# Patient Record
Sex: MALE | Race: WHITE | NOT HISPANIC OR LATINO | Employment: OTHER | URBAN - METROPOLITAN AREA
[De-identification: names, ages, dates, MRNs, and addresses within clinical notes are randomized per-mention and may not be internally consistent; named-entity substitution may affect disease eponyms.]

---

## 2022-01-01 ENCOUNTER — APPOINTMENT (OUTPATIENT)
Dept: CT IMAGING | Facility: CLINIC | Age: 72
End: 2022-01-01
Attending: EMERGENCY MEDICINE
Payer: MEDICARE

## 2022-01-01 ENCOUNTER — HOSPITAL ENCOUNTER (EMERGENCY)
Facility: CLINIC | Age: 72
Discharge: HOME | End: 2022-11-18
Attending: EMERGENCY MEDICINE | Admitting: EMERGENCY MEDICINE
Payer: MEDICARE

## 2022-01-01 VITALS
OXYGEN SATURATION: 97 % | HEART RATE: 100 BPM | SYSTOLIC BLOOD PRESSURE: 114 MMHG | TEMPERATURE: 96.7 F | WEIGHT: 128 LBS | RESPIRATION RATE: 16 BRPM | DIASTOLIC BLOOD PRESSURE: 64 MMHG

## 2022-01-01 DIAGNOSIS — K59.03 DRUG-INDUCED CONSTIPATION: ICD-10-CM

## 2022-01-01 DIAGNOSIS — D72.818 OTHER DECREASED WHITE BLOOD CELL (WBC) COUNT: ICD-10-CM

## 2022-01-01 DIAGNOSIS — N28.9 RENAL DYSFUNCTION: ICD-10-CM

## 2022-01-01 LAB
ALBUMIN SERPL-MCNC: 2 G/DL (ref 3.4–5)
ALP SERPL-CCNC: 53 U/L (ref 40–150)
ALT SERPL W P-5'-P-CCNC: 25 U/L (ref 0–70)
ANION GAP SERPL CALCULATED.3IONS-SCNC: 10 MMOL/L (ref 3–14)
AST SERPL W P-5'-P-CCNC: 27 U/L (ref 0–45)
ATRIAL RATE - MUSE: 100 BPM
BASOPHILS # BLD MANUAL: 0 10E3/UL (ref 0–0.2)
BASOPHILS NFR BLD MANUAL: 0 %
BILIRUB SERPL-MCNC: 0.6 MG/DL (ref 0.2–1.3)
BUN SERPL-MCNC: 38 MG/DL (ref 7–30)
CALCIUM SERPL-MCNC: 8 MG/DL (ref 8.5–10.1)
CHLORIDE BLD-SCNC: 101 MMOL/L (ref 94–109)
CO2 SERPL-SCNC: 22 MMOL/L (ref 20–32)
CREAT SERPL-MCNC: 1.27 MG/DL (ref 0.66–1.25)
DIASTOLIC BLOOD PRESSURE - MUSE: NORMAL MMHG
EOSINOPHIL # BLD MANUAL: 0 10E3/UL (ref 0–0.7)
EOSINOPHIL NFR BLD MANUAL: 0 %
ERYTHROCYTE [DISTWIDTH] IN BLOOD BY AUTOMATED COUNT: 14.1 % (ref 10–15)
GFR SERPL CREATININE-BSD FRML MDRD: 59 ML/MIN/1.73M2
GLUCOSE BLD-MCNC: 158 MG/DL (ref 70–99)
HCT VFR BLD AUTO: 32 % (ref 40–53)
HGB BLD-MCNC: 10.5 G/DL (ref 13.3–17.7)
HOLD SPECIMEN: NORMAL
INTERPRETATION ECG - MUSE: NORMAL
LIPASE SERPL-CCNC: 144 U/L (ref 73–393)
LYMPHOCYTES # BLD MANUAL: 0.2 10E3/UL (ref 0.8–5.3)
LYMPHOCYTES NFR BLD MANUAL: 6 %
MCH RBC QN AUTO: 30.8 PG (ref 26.5–33)
MCHC RBC AUTO-ENTMCNC: 32.8 G/DL (ref 31.5–36.5)
MCV RBC AUTO: 94 FL (ref 78–100)
METAMYELOCYTES # BLD MANUAL: 0 10E3/UL
METAMYELOCYTES NFR BLD MANUAL: 1 %
MONOCYTES # BLD MANUAL: 0 10E3/UL (ref 0–1.3)
MONOCYTES NFR BLD MANUAL: 1 %
NEUTROPHILS # BLD MANUAL: 2.7 10E3/UL (ref 1.6–8.3)
NEUTROPHILS NFR BLD MANUAL: 92 %
P AXIS - MUSE: 29 DEGREES
PLAT MORPH BLD: ABNORMAL
PLATELET # BLD AUTO: 457 10E3/UL (ref 150–450)
POTASSIUM BLD-SCNC: 4.6 MMOL/L (ref 3.4–5.3)
PR INTERVAL - MUSE: 152 MS
PROT SERPL-MCNC: 6.6 G/DL (ref 6.8–8.8)
QRS DURATION - MUSE: 84 MS
QT - MUSE: 358 MS
QTC - MUSE: 461 MS
R AXIS - MUSE: 8 DEGREES
RBC # BLD AUTO: 3.41 10E6/UL (ref 4.4–5.9)
RBC MORPH BLD: ABNORMAL
SODIUM SERPL-SCNC: 133 MMOL/L (ref 133–144)
SYSTOLIC BLOOD PRESSURE - MUSE: NORMAL MMHG
T AXIS - MUSE: 35 DEGREES
VENTRICULAR RATE- MUSE: 100 BPM
WBC # BLD AUTO: 2.9 10E3/UL (ref 4–11)

## 2022-01-01 PROCEDURE — 96374 THER/PROPH/DIAG INJ IV PUSH: CPT | Mod: 59

## 2022-01-01 PROCEDURE — 93005 ELECTROCARDIOGRAM TRACING: CPT

## 2022-01-01 PROCEDURE — 85027 COMPLETE CBC AUTOMATED: CPT | Performed by: EMERGENCY MEDICINE

## 2022-01-01 PROCEDURE — 96372 THER/PROPH/DIAG INJ SC/IM: CPT | Performed by: EMERGENCY MEDICINE

## 2022-01-01 PROCEDURE — 250N000011 HC RX IP 250 OP 636: Performed by: EMERGENCY MEDICINE

## 2022-01-01 PROCEDURE — 250N000009 HC RX 250: Performed by: EMERGENCY MEDICINE

## 2022-01-01 PROCEDURE — 80053 COMPREHEN METABOLIC PANEL: CPT | Performed by: EMERGENCY MEDICINE

## 2022-01-01 PROCEDURE — 96361 HYDRATE IV INFUSION ADD-ON: CPT

## 2022-01-01 PROCEDURE — 74177 CT ABD & PELVIS W/CONTRAST: CPT | Mod: MG

## 2022-01-01 PROCEDURE — 83690 ASSAY OF LIPASE: CPT | Performed by: EMERGENCY MEDICINE

## 2022-01-01 PROCEDURE — 96375 TX/PRO/DX INJ NEW DRUG ADDON: CPT

## 2022-01-01 PROCEDURE — 258N000003 HC RX IP 258 OP 636: Performed by: EMERGENCY MEDICINE

## 2022-01-01 PROCEDURE — 99285 EMERGENCY DEPT VISIT HI MDM: CPT | Mod: 25

## 2022-01-01 PROCEDURE — 85007 BL SMEAR W/DIFF WBC COUNT: CPT | Performed by: EMERGENCY MEDICINE

## 2022-01-01 PROCEDURE — 36415 COLL VENOUS BLD VENIPUNCTURE: CPT | Performed by: EMERGENCY MEDICINE

## 2022-01-01 RX ORDER — IOPAMIDOL 755 MG/ML
64 INJECTION, SOLUTION INTRAVASCULAR ONCE
Status: COMPLETED | OUTPATIENT
Start: 2022-01-01 | End: 2022-01-01

## 2022-01-01 RX ORDER — METHYLNALTREXONE BROMIDE 8 MG/.4ML
8 INJECTION, SOLUTION SUBCUTANEOUS
Qty: 6 ML | Refills: 0 | Status: SHIPPED | OUTPATIENT
Start: 2022-01-01

## 2022-01-01 RX ORDER — ONDANSETRON 2 MG/ML
4 INJECTION INTRAMUSCULAR; INTRAVENOUS ONCE
Status: COMPLETED | OUTPATIENT
Start: 2022-01-01 | End: 2022-01-01

## 2022-01-01 RX ADMIN — SODIUM CHLORIDE 500 ML: 9 INJECTION, SOLUTION INTRAVENOUS at 12:43

## 2022-01-01 RX ADMIN — ONDANSETRON 4 MG: 2 INJECTION INTRAMUSCULAR; INTRAVENOUS at 14:49

## 2022-01-01 RX ADMIN — METHYLNALTREXONE BROMIDE 8 MG: 12 INJECTION, SOLUTION SUBCUTANEOUS at 14:49

## 2022-01-01 RX ADMIN — HYDROMORPHONE HYDROCHLORIDE 1 MG: 1 INJECTION, SOLUTION INTRAMUSCULAR; INTRAVENOUS; SUBCUTANEOUS at 12:43

## 2022-01-01 RX ADMIN — SODIUM CHLORIDE 60 ML: 9 INJECTION, SOLUTION INTRAVENOUS at 12:54

## 2022-01-01 RX ADMIN — IOPAMIDOL 64 ML: 755 INJECTION, SOLUTION INTRAVENOUS at 12:54

## 2022-01-01 ASSESSMENT — ENCOUNTER SYMPTOMS
VOMITING: 1
CONSTIPATION: 1
ABDOMINAL PAIN: 1
APPETITE CHANGE: 1
FEVER: 0
NAUSEA: 1
FATIGUE: 1

## 2022-01-01 ASSESSMENT — ACTIVITIES OF DAILY LIVING (ADL)
ADLS_ACUITY_SCORE: 35
ADLS_ACUITY_SCORE: 35

## 2022-11-18 NOTE — ED TRIAGE NOTES
PT HAS STAGE 4 CANCER ( PANCREATIC ) - HAVING INCREASED ABD PAIN - NOT EATING - A LITTLE VOMITING TODAY   PT HAS UNDERGONE 3 DRAINAGE PROCEDURES FOR HIS METASTATIC ACSITES . WORRIED ABOUT BOWEL OBSTRUCTION TODAY PER PT'S PRIMARY .      Triage Assessment     Row Name 11/18/22 1120       Triage Assessment (Adult)    Airway WDL WDL       Respiratory WDL    Respiratory WDL WDL       Skin Circulation/Temperature WDL    Skin Circulation/Temperature WDL X  PALE       Cardiac WDL    Cardiac WDL WDL       Peripheral/Neurovascular WDL    Peripheral Neurovascular WDL WDL       Cognitive/Neuro/Behavioral WDL    Cognitive/Neuro/Behavioral WDL WDL

## 2022-11-18 NOTE — ED PROVIDER NOTES
History   Chief Complaint:  Abdominal Pain     The history is provided by the patient, the spouse and medical records.      Jax Cameron is a 72 year old male with history of stage 4 pancreatic cancer metastasized to the liver, hypertension, hypercholesterolemia, and type 2 diabetes mellitus who presents with abdominal pain. The patient was recently diagnosed with stage 4 pancreatic cancer approximately 2.5 weeks ago. He was active and energetic prior to his diagnosis, however he has become increasingly fatigued and has developed sharp generalized abdominal pain, which has been worsening as well. He initially developed a distended feeling in his abdomen, which had resolved after 2L of ascites fluid was removed. He has had two more paracentesis procedures for his malignant ascites. His second paracentesis was Sunday morning, 5 days ago, where he had 4L of fluid removed. He continued to experience abdominal pain and had an additional 2.3L of fluid removed yesterday, with 1L of IVF given via his port afterwards due to recent reduced PO intake. Here, he continues to endorse generalized sharp abdominal pain. He has increased his Dilaudid dosage and is given 2 mg of this every 3 hours with minimal improvement in pain. He has not had a bowel movement for the past few days and reports he has not passed gas either. He has not been eating well and was unable to maintain PO intake today. He has had an episode of emesis today after attempting to eat. He voices concern that eating increases his nausea. He had his first chemotherapy visit on Tuesday, 3 days ago, and has been following the nausea regimen of Zofran TID with mild improvement of nausea. He denies any high fevers at home. He endorses abdominal pain that occasionally radiates to his back. He states pain worsens with movement and ambulation, and is slightly relieved when lying supine or taking pain medications. He had some bright red blood in his stool a few weeks ago,  however he was constipated during that time and has history of hemorrhoids. He has had recent imaging done, including a abdominal CT scan and PET scan, which was negative for bowel obstruction or new advancing malignancies. The patient follows Dr. Simon Irene of oncology from [facility].     Imaging from 11/13/2022  CT ABD PELVIS W IV CONT ONLY  IMPRESSION:   1.  Little short interval change in the approximately 4.4 cm infiltrative mass of the pancreatic tail concerning for adenocarcinoma. As before, the mass directly invades the splenic hilum and encases and occludes the splenic artery and vein and may directly involve the spleen. The mass contacts the left adrenal gland and posterior wall of the stomach.   2.  Extensive peritoneal and omental carcinomatosis.   3.  Large volume of presumably malignant ascites has increased from previous.   4.  No short interval change in a 2.2 cm hypodense lesion of the left hepatic lobe suspicious for a metastasis.   5.  6 mm subcapsular enhancing focus of the left hepatic lobe was not conspicuous on the recent prior study. This is indeterminate, possibly a benign perfusion anomaly. Attention on follow-up recommended.   6.  Persistent mild to moderate right hydronephrosis despite adequate positioning of the ureteral stent. Finding suggests possible stent malfunction. Clinical correlation recommended.   7.  Small bilateral pleural effusions and bibasilar atelectasis.  Reading per radiology.    Imaging from 11/14/2022  NM PET/CT SKULL BASE TO MID THIGH   IMPRESSION:   Findings suspicious for pancreatic tail neoplasm with left adrenal and diffuse peritoneal metastases.  Reading per radiology.    Review of Systems   Constitutional: Positive for appetite change and fatigue. Negative for fever.   Gastrointestinal: Positive for abdominal pain, constipation, nausea and vomiting.   All other systems reviewed and are negative.    Allergies:  The patient has no known allergies.      Medications:  Medical cannabis  Lipitor   Dilaudid   Zestril   Ativan  Metformin  Omeprazole   Zofran  Compazine   Senna  Revatio     Past Medical History:     Pancreatic cancer metastasized to liver   Pancreatic mass  Hydronephrosis of right kidney   Mild aortic stenosis   Mild aortic regurgitation   Left ventricular hypertrophy   Erectile dysfunction  Branch retinal vein occlusion of left eye  Hypertension  Hypercholesterolemia  Diabetes mellitus, type 2    Past Surgical History:    Cataract removal with IOL  Hemorrhoidectomy     Family History:    Mother: Heart Disease, Hypertension     Social History:  The patient presents to the ED with his wife and daughter.   PCP: Eliud Bustillos     Physical Exam     Patient Vitals for the past 24 hrs:   BP Temp Pulse Resp SpO2 Weight   11/18/22 1448 114/64 -- 100 16 97 % --   11/18/22 1219 (!) 144/77 -- 101 -- 97 % --   11/18/22 1100 118/72 (!) 96.7  F (35.9  C) (!) 138 16 97 % 58.1 kg (128 lb)     Physical Exam  General: lying with eyes closed, alert  HENT: mucous membranes moist  CV: regular rate, regular rhythm  Resp: normal effort, clear throughout, no crackles or wheezing  GI: abdomen soft, slightly distended, with mild diffuse abdominal tenderness.  MSK: no bony tenderness  Skin: appropriately warm and dry  Extremities: mild non-pitting edema, calves non-tender  Neuro: alert, clear speech, oriented  Psych: normal mood and affect    Emergency Department Course   ECG  ECG results from 11/18/22   EKG 12-lead, tracing only     Value    Systolic Blood Pressure     Diastolic Blood Pressure     Ventricular Rate 100    Atrial Rate 100    MI Interval 152    QRS Duration 84        QTc 461    P Axis 29    R AXIS 8    T Axis 35    Interpretation ECG      Sinus rhythm with Premature atrial complexes  Otherwise normal ECG  No previous ECGs available       Imaging:  CT Abdomen Pelvis w Contrast   Final Result   IMPRESSION:    1.  Findings consistent with known pancreatic  cancer with hepatic   metastases and likely peritoneal carcinomatosis. Moderate volume   ascites.   2.  Likely constipation without evidence of yamileth bowel obstruction.   3.  Right ureteral stent in place with persistent moderate   hydronephrosis, suggestive of stent dysfunction.      JESSE MALLORY MD            SYSTEM ID:  [id number]      Report per radiology    Laboratory:  Labs Ordered and Resulted from Time of ED Arrival to Time of ED Departure   COMPREHENSIVE METABOLIC PANEL - Abnormal       Result Value    Sodium 133      Potassium 4.6      Chloride 101      Carbon Dioxide (CO2) 22      Anion Gap 10      Urea Nitrogen 38 (*)     Creatinine 1.27 (*)     Calcium 8.0 (*)     Glucose 158 (*)     Alkaline Phosphatase 53      AST 27      ALT 25      Protein Total 6.6 (*)     Albumin 2.0 (*)     Bilirubin Total 0.6      GFR Estimate 59 (*)    CBC WITH PLATELETS AND DIFFERENTIAL - Abnormal    WBC Count 2.9 (*)     RBC Count 3.41 (*)     Hemoglobin 10.5 (*)     Hematocrit 32.0 (*)     MCV 94      MCH 30.8      MCHC 32.8      RDW 14.1      Platelet Count 457 (*)    DIFFERENTIAL - Abnormal    % Neutrophils 92      % Lymphocytes 6      % Monocytes 1      % Eosinophils 0      % Basophils 0      % Metamyelocytes 1      Absolute Neutrophils 2.7      Absolute Lymphocytes 0.2 (*)     Absolute Monocytes 0.0      Absolute Eosinophils 0.0      Absolute Basophils 0.0      Absolute Metamyelocytes 0.0      RBC Morphology Confirmed RBC Indices      Platelet Assessment        Value: Automated Count Confirmed. Platelet morphology is normal.   LIPASE - Normal    Lipase 144        Emergency Department Course:     Reviewed:  I reviewed nursing notes, vitals, past medical history and Care Everywhere    Assessments:  1231 I obtained history and examined the patient as noted above.   1414 I rechecked the patient and explained findings.  1550 I rechecked the patient. Patient had good output with methylnaltrexone and feels improved.  Comfortable with discharge.     Interventions:  1243 0.9% sodium chloride BOLUS 500 mL IV  1243 Dilaudid 1 mg IV  1449 Zofran 4 mg IV  1449 Methylnaltrexone 8 mg SQ    Disposition:  The patient was discharged to home.     Impression & Plan   Medical Decision Making:  Jax Cameron is a 72 year old male who with history of stage 4 pancreatic cancer metastasized to the liver, hypertension, hypercholesterolemia, and type 2 diabetes mellitus who presents with abdominal pain and decreased stool output.  He is currently receiving palliative chemotherapy and paracentesis for malignant ascites.  His wife, who is an anesthesiologist, has been managing his pain with escalating doses of dilaudid.  Today, he is afebrile without peritonitis.  CT was obtained to evaluate for bowel obstruction, versus other intraabdominal cause of abdominal pain.  I reviewed this in detail line-by-line with his wife, alongside the interpretation of his last CT on 11/13 with [facility].  None of the findings, including splenic vein thrombosis and hydronephrosis, represent a new process.  Given evidence of stool burden and increased narcotic use, we used methynaltrexone with good results.  The patient feels better after passing a large amount of stool. There are no signs at this point for a need for rectal disimpaction.  Patient and wife with start miralax, continue senna, and use methylnaltrexone subcutaneous if needed. . Patient and wife are agreeable with this and questions are answered. They will follow up with his oncologist early this week.    Diagnosis:    ICD-10-CM    1. Drug-induced constipation  K59.03       2. Other decreased white blood cell (WBC) count  D72.818       3. Renal dysfunction  N28.9         Discharge Medications:  New Prescriptions    METHYLNALTREXONE BROMIDE (RELISTOR) 8 MG/0.4ML SOLN INJECTION    Inject 0.4 mLs (8 mg) Subcutaneous every 48 hours as needed for constipation (constipation)     Scribe Disclosure:  Lynda HANDLEY  Yuniel, am serving as a scribe at 12:13 PM on 11/18/2022 to document services personally performed by Julissa Amor MD based on my observations and the provider's statements to me.    Julissa Amor MD  11/19/22 0705